# Patient Record
Sex: FEMALE | Race: WHITE | Employment: STUDENT | URBAN - METROPOLITAN AREA
[De-identification: names, ages, dates, MRNs, and addresses within clinical notes are randomized per-mention and may not be internally consistent; named-entity substitution may affect disease eponyms.]

---
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
## 2025-01-02 PROBLEM — M25.532 LEFT WRIST PAIN: Status: ACTIVE | Noted: 2025-01-02

## 2025-01-02 ASSESSMENT — ENCOUNTER SYMPTOMS
ALLEVIATING FACTORS: AVOIDING MOVEMENT IN INVOLVED AREA
QUALITY: DISCOMFORT
PAIN FREQUENCY: INTERMITTENT
QUALITY: TIGHT
ALLEVIATING FACTORS: ICE
QUALITY: STIFF

## 2025-01-05 ENCOUNTER — HOSPITAL ENCOUNTER (EMERGENCY)
Facility: HOSPITAL | Age: 20
Discharge: HOME | End: 2025-01-05
Attending: EMERGENCY MEDICINE
Payer: COMMERCIAL

## 2025-01-05 ENCOUNTER — APPOINTMENT (OUTPATIENT)
Dept: GENERAL RADIOLOGY | Facility: HOSPITAL | Age: 20
End: 2025-01-05
Attending: EMERGENCY MEDICINE
Payer: COMMERCIAL

## 2025-01-05 VITALS
WEIGHT: 150 LBS | SYSTOLIC BLOOD PRESSURE: 113 MMHG | RESPIRATION RATE: 17 BRPM | BODY MASS INDEX: 25 KG/M2 | OXYGEN SATURATION: 100 % | HEART RATE: 83 BPM | DIASTOLIC BLOOD PRESSURE: 82 MMHG | TEMPERATURE: 98 F

## 2025-01-05 DIAGNOSIS — R06.4 HYPERVENTILATION: ICD-10-CM

## 2025-01-05 DIAGNOSIS — N94.6 SEVERE MENSTRUAL CRAMPS: Primary | ICD-10-CM

## 2025-01-05 DIAGNOSIS — R55 VASOVAGAL NEAR SYNCOPE: ICD-10-CM

## 2025-01-05 LAB
ALBUMIN SERPL-MCNC: 5 G/DL (ref 3.2–4.8)
ALBUMIN/GLOB SERPL: 2 {RATIO} (ref 1–2)
ALP LIVER SERPL-CCNC: 46 U/L
ALT SERPL-CCNC: <7 U/L
ANION GAP SERPL CALC-SCNC: 7 MMOL/L (ref 0–18)
AST SERPL-CCNC: 13 U/L (ref ?–34)
ATRIAL RATE: 75 BPM
BASOPHILS # BLD AUTO: 0.03 X10(3) UL (ref 0–0.2)
BASOPHILS NFR BLD AUTO: 0.2 %
BILIRUB SERPL-MCNC: 0.7 MG/DL (ref 0.3–1.2)
BUN BLD-MCNC: 9 MG/DL (ref 9–23)
CALCIUM BLD-MCNC: 9.9 MG/DL (ref 8.7–10.4)
CHLORIDE SERPL-SCNC: 106 MMOL/L (ref 98–112)
CO2 SERPL-SCNC: 25 MMOL/L (ref 21–32)
CREAT BLD-MCNC: 0.62 MG/DL
EGFRCR SERPLBLD CKD-EPI 2021: 131 ML/MIN/1.73M2 (ref 60–?)
EOSINOPHIL # BLD AUTO: 0.02 X10(3) UL (ref 0–0.7)
EOSINOPHIL NFR BLD AUTO: 0.1 %
ERYTHROCYTE [DISTWIDTH] IN BLOOD BY AUTOMATED COUNT: 12.7 %
GLOBULIN PLAS-MCNC: 2.5 G/DL (ref 2–3.5)
GLUCOSE BLD-MCNC: 93 MG/DL (ref 70–99)
HCG SERPL QL: NEGATIVE
HCT VFR BLD AUTO: 38.3 %
HGB BLD-MCNC: 13.1 G/DL
IMM GRANULOCYTES # BLD AUTO: 0.07 X10(3) UL (ref 0–1)
IMM GRANULOCYTES NFR BLD: 0.5 %
LYMPHOCYTES # BLD AUTO: 1.26 X10(3) UL (ref 1.5–5)
LYMPHOCYTES NFR BLD AUTO: 8.7 %
MCH RBC QN AUTO: 30.8 PG (ref 26–34)
MCHC RBC AUTO-ENTMCNC: 34.2 G/DL (ref 31–37)
MCV RBC AUTO: 89.9 FL
MONOCYTES # BLD AUTO: 0.58 X10(3) UL (ref 0.1–1)
MONOCYTES NFR BLD AUTO: 4 %
NEUTROPHILS # BLD AUTO: 12.59 X10 (3) UL (ref 1.5–7.7)
NEUTROPHILS # BLD AUTO: 12.59 X10(3) UL (ref 1.5–7.7)
NEUTROPHILS NFR BLD AUTO: 86.5 %
OSMOLALITY SERPL CALC.SUM OF ELEC: 284 MOSM/KG (ref 275–295)
P AXIS: 45 DEGREES
P-R INTERVAL: 136 MS
PLATELET # BLD AUTO: 312 10(3)UL (ref 150–450)
POTASSIUM SERPL-SCNC: 3.9 MMOL/L (ref 3.5–5.1)
PROT SERPL-MCNC: 7.5 G/DL (ref 5.7–8.2)
Q-T INTERVAL: 390 MS
QRS DURATION: 74 MS
QTC CALCULATION (BEZET): 435 MS
R AXIS: 80 DEGREES
RBC # BLD AUTO: 4.26 X10(6)UL
SODIUM SERPL-SCNC: 138 MMOL/L (ref 136–145)
T AXIS: 60 DEGREES
VENTRICULAR RATE: 75 BPM
WBC # BLD AUTO: 14.6 X10(3) UL (ref 4–11)

## 2025-01-05 PROCEDURE — 93005 ELECTROCARDIOGRAM TRACING: CPT

## 2025-01-05 PROCEDURE — 99284 EMERGENCY DEPT VISIT MOD MDM: CPT

## 2025-01-05 PROCEDURE — 71046 X-RAY EXAM CHEST 2 VIEWS: CPT | Performed by: EMERGENCY MEDICINE

## 2025-01-05 PROCEDURE — 85025 COMPLETE CBC W/AUTO DIFF WBC: CPT | Performed by: EMERGENCY MEDICINE

## 2025-01-05 PROCEDURE — 96361 HYDRATE IV INFUSION ADD-ON: CPT

## 2025-01-05 PROCEDURE — 80053 COMPREHEN METABOLIC PANEL: CPT | Performed by: EMERGENCY MEDICINE

## 2025-01-05 PROCEDURE — 96374 THER/PROPH/DIAG INJ IV PUSH: CPT

## 2025-01-05 PROCEDURE — 84703 CHORIONIC GONADOTROPIN ASSAY: CPT | Performed by: EMERGENCY MEDICINE

## 2025-01-05 PROCEDURE — 93010 ELECTROCARDIOGRAM REPORT: CPT

## 2025-01-05 RX ORDER — KETOROLAC TROMETHAMINE 30 MG/ML
15 INJECTION, SOLUTION INTRAMUSCULAR; INTRAVENOUS ONCE
Status: COMPLETED | OUTPATIENT
Start: 2025-01-05 | End: 2025-01-05

## 2025-01-05 RX ORDER — KETOROLAC TROMETHAMINE 10 MG/1
10 TABLET, FILM COATED ORAL EVERY 8 HOURS PRN
Qty: 15 TABLET | Refills: 0 | Status: SHIPPED | OUTPATIENT
Start: 2025-01-05 | End: 2025-01-12
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
## 2025-01-05 NOTE — ED PROVIDER NOTES
Patient Seen in: [facility] Emergency Department      History     Chief Complaint   Patient presents with    Anxiety/Panic attack    Kavon-EVANGELINA     Stated Complaint: Patient to ED for panic attack while in the bathroom at [facility]    Subjective:   SUSAN Lazo is a 19-year-old with history of syncope who presents for evaluation of severe abdominal pain and panic attack.  She states that she started her menses today and she went to [facility].  While she was at [facility] she had sudden and severe menstrual cramps and she became panicked.  She states that she felt as if she was about to faint.  She states that she was breathing very fast and had numbness and tingling of her hands and feet.  She feels that she was hyperventilating.  911 was called and she was brought here for evaluation.    The patient states that her abdominal pain is improved but still persisting.  She denies having any other symptoms such as fever, cough, vomiting or diarrhea.    Objective:     History reviewed. No pertinent past medical history.           History reviewed. No pertinent surgical history.             Social History     Socioeconomic History    Marital status: Single   Tobacco Use    Smoking status: Never    Smokeless tobacco: Never   Vaping Use    Vaping status: Never Used   Substance and Sexual Activity    Alcohol use: Never    Drug use: Never   Other Topics Concern    Caffeine Concern Yes     Comment: occ soda    Exercise No     Social Drivers of Health      [facility]    Housing Stability                  Physical Exam     ED Triage Vitals [01/05/25 1212]   BP (!) 135/94   Pulse 75   Resp 18   Temp 97.8 °F (36.6 °C)   Temp src Temporal   SpO2 100 %   O2 Device None (Room air)       Current Vitals:   Vital Signs  BP: 126/90  Pulse: 82  Resp: 17  Temp: 97.8 °F (36.6 °C)  Temp src: Temporal  MAP (mmHg): (!) 103    Oxygen Therapy  SpO2: 95 %  O2 Device: None (Room  air)        Physical Exam  General: Well appearing child in no acute distress.  HEENT: Atraumatic, normocephalic.  Pupils equally round and reactive to light.  Extra ocular movements are intact and full.  Tympanic membranes are clear bilaterally.  Oropharynx is clear and moist.  No erythema or exudate.  Neck: Supple with good range of motion.  No lymphadenopathy and no evidence of meningismus.   Chest: Good aeration bilaterally with no rales, no retractions or wheezing.  Heart: Regular rate and rhythm.  S1 and S2.  No murmurs, no rubs or gallops.  Good peripheral pulses.  Abdomen: Nice and soft with good bowel sounds.  Non-tender and non-distended.  She states that the palpation does not make her abdominal pain worse.  She states that her pain feels like menstrual cramps.  No hepatosplenomegaly and no masses.  Extremities: Clear, warm and dry with no petechiae or purpura.  Neurologic: Alert and oriented X3.  Cranial nerves II through XII is intact.  Good tone and strength throughout.       ED Course     Labs Reviewed   CBC WITH DIFFERENTIAL WITH PLATELET - Abnormal; Notable for the following components:       Result Value    WBC 14.6 (*)     Neutrophil Absolute Prelim 12.59 (*)     Neutrophil Absolute 12.59 (*)     Lymphocyte Absolute 1.26 (*)     All other components within normal limits   COMP METABOLIC PANEL (14) - Abnormal; Notable for the following components:    Albumin 5.0 (*)     All other components within normal limits   HCG, BETA SUBUNIT, QUAL - Normal     EKG    Intervals and axes as noted on EKG report.  Rate: 75.  Rhythm: Normal sinus rhythm  Reading: Intervals were normal with a QTC of 435.  Normal axis with no ST elevation.  There was no evidence of ischemia or ventricular hypertrophy.  Normal EKG for age.  Agree with computer interpretation.            Radiology:  Imaging ordered independently visualized and interpreted by myself (along with review of radiologist's interpretation) and noted the  following: My interpretation of the chest x-ray shows no evidence of cardiomegaly or infiltrates.    XR CHEST PA + LAT CHEST (CPT=71046)    Result Date: 1/5/2025  CONCLUSION:  No acute radiographic findings.   LOCATION:  [facility]   Dictated by (CST): Stephen Barrow MD on 1/05/2025 at 1:55 PM     Finalized by (CST): Stephen Barrow MD on 1/05/2025 at 1:55 PM        Labs:  ^^ Personally ordered, reviewed, and interpreted all unique tests ordered.  Clinically significant labs noted: Her serum studies were within normal limits with exception of white count which was elevated at 14.6.  Her hemoglobin was normal and beta-hCG was negative.    Medications administered:  Medications   sodium chloride 0.9 % IV bolus 1,000 mL (1,000 mL Intravenous New Bag 1/5/25 1330)   ketorolac (Toradol) 30 MG/ML injection 15 mg (15 mg Intravenous Given 1/5/25 1330)       Pulse oximetry:  Pulse oximetry on room air is 95% and is normal.     Cardiac monitoring:  Initial heart rate is 82 and is normal for age    Vital signs:  Vitals:    01/05/25 1212 01/05/25 1315   BP: (!) 135/94 126/90   Pulse: 75 82   Resp: 18 17   Temp: 97.8 °F (36.6 °C)    TempSrc: Temporal    SpO2: 100% 95%   Weight: 68 kg        Chart review:  ^^ Review of prior external notes from unique sources (non-[facility] ED records): noted in history         MDM      Assessment & Plan:    Patient presents with abdominal pain, near syncope and hyperventilation.     ^^ Independent historian: parent   ^^ Significant history or co-morbidities that affected clinical decision making: History of syncope  ^^ Differential diagnoses considered: I considered various etiologies / differetial diagosis including but not limited to, vasovagal near syncope, menstrual cramping, panic attack, hyperventilation. The patient was well-appearing and did not show any evidence of serious bacterial infection.  ^^ Diagnostic tests considered but not performed: None    ED Course:    I obtained a EKG.  An IV was  placed and I obtained a CBC, comprehensive metabolic panel as well as beta-hCG.  She was given a normal saline bolus as well as IV Toradol.  I then obtained a chest x-ray.    Her EKG showed a normal sinus rhythm with no evidence of ischemia, dysrhythmia or ventricular hypertrophy.  She did not have any evidence of prolonged QTc.  Her chest x-ray was unremarkable with no evidence of infiltrate or consolidation.  She also did not have any evidence of cardiomegaly or pneumothorax.  Her serum studies was notable for elevated white count of 14.6.  This most likely is a stress response.  Her hemoglobin was normal and her beta-hCG was negative.  The patient stated that she felt better after the IV fluids as well as Toradol.      Her history and physical exam is most consistent with severe menstrual cramp, near vasovagal syncope, hyperventilation as well as a panic attack.  She is to continue with oral Toradol 1 tablet every 8 hours as needed for abdominal pain.  She is not to take any other NSAIDs while taking Toradol.  She is to return for any worsening abdominal pain, fever, vomiting or any concerning symptoms.      ^^ Prescription drug management considerations: Toradol was prescribed for home use  ^^ Consideration regarding hospitalization or escalation of care: N/A  ^^ Social determinants of health: None      I have considered other serious etiologies for this patient's complaints, however the presentation is not consistent with such entities. Patient was screened and evaluated during this visit.   As a treating physician attending to the patient, I determined, within reasonable clinical confidence and prior to discharge, that an emergency medical condition was not or was no longer present. Patient or caregiver understands the course of events that occurred in the emergency department.     There was no indication for further evaluation, treatment or admission on an emergency basis.  Comprehensive verbal and written  discharge and follow-up instructions were provided to help prevent relapse or worsening.  Parents were instructed to follow-up with the primary care provider for further evaluation and treatment, but to return immediately to the ER for worsening, concerning, new, changing or persisting symptoms.  I discussed the case with the parents - they had no questions, complaints, or concerns.  Parents felt comfortable going home.     This report has been produced using speech recognition software and may contain errors related to that system including, but not limited to, errors in grammar, punctuation, and spelling, as well as words and phrases that possibly may have been recognized inappropriately.  If there are any questions or concerns, contact the dictating provider for clarification.          Medical Decision Making      Disposition and Plan     Clinical Impression:  1. Severe menstrual cramps    2. Vasovagal near syncope    3. Hyperventilation         Disposition:  Discharge  1/5/2025  2:11 pm    Follow-up:  Elvira Weaver DO  [address]  [facility] [address]  [phone]    Follow up  If symptoms worsen          Medications Prescribed:  Current Discharge Medication List        START taking these medications    Details   Ketorolac Tromethamine 10 MG Oral Tab Take 1 tablet (10 mg total) by mouth every 8 (eight) hours as needed.  Qty: 15 tablet, Refills: 0                 Supplementary Documentation:

## 2025-01-05 NOTE — DISCHARGE INSTRUCTIONS
Toradol 1 tablet every 8 hours as needed for severe abdominal pain.    Do not use ibuprofen or Aleve while taking Toradol.    Continue with supportive care including frequent fluids.    Return for any worsening symptoms or concerns.

## 2025-01-06 ENCOUNTER — APPOINTMENT (OUTPATIENT)
Dept: PHYSICAL THERAPY | Age: 20
End: 2025-01-06

## 2025-01-06 DIAGNOSIS — M25.532 LEFT WRIST PAIN: Primary | ICD-10-CM

## 2025-01-06 PROCEDURE — 97110 THERAPEUTIC EXERCISES: CPT | Performed by: OCCUPATIONAL THERAPIST

## 2025-01-06 PROCEDURE — 97165 OT EVAL LOW COMPLEX 30 MIN: CPT | Performed by: OCCUPATIONAL THERAPIST

## 2025-01-06 ASSESSMENT — ENCOUNTER SYMPTOMS
QUALITY: ACHE
PAIN SEVERITY NOW: 0
QUALITY: SHARP
SUBJECTIVE PAIN PROGRESSION: IMPROVED
QUALITY: TIGHT
PAIN SCALE AT LOWEST: 3
ALLEVIATING FACTORS: REST
ALLEVIATING FACTORS: ICE
PAIN SCALE AT HIGHEST: 5

## 2025-01-09 ENCOUNTER — OFFICE VISIT (OUTPATIENT)
Dept: PHYSICAL THERAPY | Age: 20
End: 2025-01-09

## 2025-01-09 DIAGNOSIS — M25.532 LEFT WRIST PAIN: Primary | ICD-10-CM

## 2025-01-09 PROCEDURE — 97112 NEUROMUSCULAR REEDUCATION: CPT | Performed by: OCCUPATIONAL THERAPIST

## 2025-01-09 PROCEDURE — 97530 THERAPEUTIC ACTIVITIES: CPT | Performed by: OCCUPATIONAL THERAPIST

## 2025-01-09 PROCEDURE — 97110 THERAPEUTIC EXERCISES: CPT | Performed by: OCCUPATIONAL THERAPIST

## 2025-01-09 ASSESSMENT — ENCOUNTER SYMPTOMS
QUALITY: SORE
ALLEVIATING FACTORS: REST
PAIN SEVERITY NOW: 3
PAIN FREQUENCY: INTERMITTENT
PAIN LOCATION: LEFT DORSAL WRIST
QUALITY: ACHE

## 2025-01-14 ENCOUNTER — APPOINTMENT (OUTPATIENT)
Dept: PHYSICAL THERAPY | Age: 20
End: 2025-01-14

## 2025-01-14 DIAGNOSIS — M25.532 LEFT WRIST PAIN: Primary | ICD-10-CM

## 2025-01-14 PROCEDURE — 97110 THERAPEUTIC EXERCISES: CPT | Performed by: OCCUPATIONAL THERAPIST

## 2025-01-14 PROCEDURE — 97112 NEUROMUSCULAR REEDUCATION: CPT | Performed by: OCCUPATIONAL THERAPIST

## 2025-01-14 PROCEDURE — 97530 THERAPEUTIC ACTIVITIES: CPT | Performed by: OCCUPATIONAL THERAPIST

## 2025-01-14 ASSESSMENT — ENCOUNTER SYMPTOMS
PAIN LOCATION: LEFT DORSAL WRIST
ALLEVIATING FACTORS: REST
QUALITY: ACHE
ALLEVIATING FACTORS: ICE

## 2025-01-17 ENCOUNTER — APPOINTMENT (OUTPATIENT)
Dept: PHYSICAL THERAPY | Age: 20
End: 2025-01-17

## 2025-01-17 DIAGNOSIS — M25.532 LEFT WRIST PAIN: Primary | ICD-10-CM

## 2025-01-17 ASSESSMENT — ENCOUNTER SYMPTOMS
QUALITY: SORE
PAIN FREQUENCY: INTERMITTENT
ALLEVIATING FACTORS: REST
PAIN SEVERITY NOW: 2
PAIN LOCATION: LEFT VOLAR WRIST

## 2025-01-21 ENCOUNTER — APPOINTMENT (OUTPATIENT)
Dept: PHYSICAL THERAPY | Age: 20
End: 2025-01-21

## 2025-01-21 DIAGNOSIS — M25.532 LEFT WRIST PAIN: Primary | ICD-10-CM

## 2025-01-21 PROCEDURE — 97530 THERAPEUTIC ACTIVITIES: CPT | Performed by: OCCUPATIONAL THERAPIST

## 2025-01-21 PROCEDURE — 97140 MANUAL THERAPY 1/> REGIONS: CPT | Performed by: OCCUPATIONAL THERAPIST

## 2025-01-21 PROCEDURE — 97110 THERAPEUTIC EXERCISES: CPT | Performed by: OCCUPATIONAL THERAPIST

## 2025-01-21 PROCEDURE — 97112 NEUROMUSCULAR REEDUCATION: CPT | Performed by: OCCUPATIONAL THERAPIST

## 2025-01-21 ASSESSMENT — ENCOUNTER SYMPTOMS
PAIN FREQUENCY: CONSTANT
ALLEVIATING FACTORS: ICE
ALLEVIATING FACTORS: REST
QUALITY: ACHE
PAIN SCALE AT HIGHEST: 4
QUALITY: STIFF
QUALITY: TIGHT
PAIN SEVERITY NOW: 1
PAIN LOCATION: LEFT DORSAL WRIST

## 2025-01-28 ENCOUNTER — APPOINTMENT (OUTPATIENT)
Dept: PHYSICAL THERAPY | Age: 20
End: 2025-01-28

## 2025-01-28 DIAGNOSIS — M25.532 LEFT WRIST PAIN: Primary | ICD-10-CM

## 2025-01-28 PROCEDURE — 97140 MANUAL THERAPY 1/> REGIONS: CPT | Performed by: OCCUPATIONAL THERAPIST

## 2025-01-28 PROCEDURE — 97530 THERAPEUTIC ACTIVITIES: CPT | Performed by: OCCUPATIONAL THERAPIST

## 2025-01-28 PROCEDURE — 97110 THERAPEUTIC EXERCISES: CPT | Performed by: OCCUPATIONAL THERAPIST

## 2025-01-28 PROCEDURE — 97112 NEUROMUSCULAR REEDUCATION: CPT | Performed by: OCCUPATIONAL THERAPIST

## 2025-01-28 ASSESSMENT — ENCOUNTER SYMPTOMS
PAIN FREQUENCY: INTERMITTENT
PAIN SCALE AT HIGHEST: 3
ALLEVIATING FACTORS: ICE
PAIN LOCATION: LEFT VOLAR WRIST
QUALITY: ACHE
PAIN SEVERITY NOW: 2
ALLEVIATING FACTORS: REST

## 2025-02-06 DIAGNOSIS — F41.1 GAD (GENERALIZED ANXIETY DISORDER): ICD-10-CM

## 2025-02-07 NOTE — TELEPHONE ENCOUNTER
Last office visit: 11/11/24   Protocol: pass  Requested medication(s) are due for refill today: yes  Requested medication(s) are on the active medication list same strength, form, dose/ sig: yes  Requested medication(s) are managed by provider: yes  Patient has already received a courtsey refill: no    NOV: none    Asked to Return: 2/11/25

## 2025-02-21 ENCOUNTER — APPOINTMENT (OUTPATIENT)
Dept: SPORTS MEDICINE | Age: 20
End: 2025-02-21

## 2025-02-21 VITALS
HEIGHT: 64 IN | BODY MASS INDEX: 26.46 KG/M2 | SYSTOLIC BLOOD PRESSURE: 112 MMHG | WEIGHT: 155 LBS | DIASTOLIC BLOOD PRESSURE: 60 MMHG

## 2025-02-21 DIAGNOSIS — S69.92XD SCAPHOLUNATE LIGAMENT INJURY WITH NO INSTABILITY, LEFT, SUBSEQUENT ENCOUNTER: Primary | ICD-10-CM

## 2025-02-21 ASSESSMENT — ENCOUNTER SYMPTOMS
ALLERGIC/IMMUNOLOGIC NEGATIVE: 1
COUGH: 0
ABDOMINAL PAIN: 0
SLEEP DISTURBANCE: 0
SORE THROAT: 0
DIARRHEA: 0
SINUS PAIN: 0
SHORTNESS OF BREATH: 0
WOUND: 0
CONSTIPATION: 0
SEIZURES: 0
NERVOUS/ANXIOUS: 0
CHILLS: 0
WHEEZING: 0
EYE PAIN: 0
BACK PAIN: 0
FEVER: 0

## 2025-04-19 DIAGNOSIS — F90.2 ADHD (ATTENTION DEFICIT HYPERACTIVITY DISORDER), COMBINED TYPE: ICD-10-CM

## 2025-04-21 RX ORDER — METHYLPHENIDATE HYDROCHLORIDE 20 MG/1
20 TABLET ORAL
Qty: 30 TABLET | Refills: 0 | OUTPATIENT
Start: 2025-04-21

## 2025-04-21 NOTE — TELEPHONE ENCOUNTER
Sent patient a [employer] message that a visit is required before we can fill meds. La wanted to see back in Feb 2025

## 2025-04-23 RX ORDER — METHYLPHENIDATE HYDROCHLORIDE 36 MG/1
72 TABLET ORAL DAILY
Qty: 60 TABLET | Refills: 0 | OUTPATIENT
Start: 2025-04-23

## 2025-04-25 RX ORDER — SERTRALINE HYDROCHLORIDE 100 MG/1
100 TABLET, FILM COATED ORAL DAILY
Qty: 90 TABLET | Refills: 0 | Status: SHIPPED | OUTPATIENT
Start: 2025-04-25

## 2025-04-25 RX ORDER — CLONIDINE HYDROCHLORIDE 0.1 MG/1
0.1 TABLET ORAL NIGHTLY
Qty: 90 TABLET | Refills: 0 | Status: SHIPPED | OUTPATIENT
Start: 2025-04-25

## 2025-04-26 DIAGNOSIS — F90.2 ADHD (ATTENTION DEFICIT HYPERACTIVITY DISORDER), COMBINED TYPE: ICD-10-CM

## 2025-04-28 RX ORDER — METHYLPHENIDATE HYDROCHLORIDE 36 MG/1
72 TABLET ORAL DAILY
Qty: 60 TABLET | Refills: 0 | OUTPATIENT
Start: 2025-04-28

## 2025-04-28 RX ORDER — METHYLPHENIDATE HYDROCHLORIDE 36 MG/1
72 TABLET ORAL DAILY
Qty: 60 TABLET | Refills: 0 | Status: SHIPPED | OUTPATIENT
Start: 2025-04-28

## 2025-04-28 RX ORDER — METHYLPHENIDATE HYDROCHLORIDE 20 MG/1
20 TABLET ORAL
Qty: 30 TABLET | Refills: 0 | OUTPATIENT
Start: 2025-04-28

## 2025-04-28 RX ORDER — METHYLPHENIDATE HYDROCHLORIDE 20 MG/1
20 TABLET ORAL DAILY
Qty: 30 TABLET | Refills: 0 | Status: SHIPPED | OUTPATIENT
Start: 2025-04-28

## 2025-04-28 NOTE — TELEPHONE ENCOUNTER
Last office visit: 4/24/25   Protocol: pass  Requested medication(s) are due for refill today: yes  Requested medication(s) are on the active medication list same strength, form, dose/ sig: yes  Requested medication(s) are managed by provider: yes  Patient has already received a courtsey refill: no    NOV: none    Asked to Return: 5/22/25

## 2025-05-16 RX ORDER — MONTELUKAST SODIUM 10 MG/1
10 TABLET ORAL NIGHTLY
Qty: 90 TABLET | Refills: 0 | Status: SHIPPED | OUTPATIENT
Start: 2025-05-16

## 2025-08-05 ENCOUNTER — HOSPITAL ENCOUNTER (EMERGENCY)
Age: 20
Discharge: HOME | End: 2025-08-05
Attending: STUDENT IN AN ORGANIZED HEALTH CARE EDUCATION/TRAINING PROGRAM

## 2025-08-05 VITALS
DIASTOLIC BLOOD PRESSURE: 75 MMHG | OXYGEN SATURATION: 97 % | RESPIRATION RATE: 18 BRPM | SYSTOLIC BLOOD PRESSURE: 117 MMHG | TEMPERATURE: 99 F | BODY MASS INDEX: 25 KG/M2 | HEART RATE: 87 BPM | WEIGHT: 150 LBS

## 2025-08-05 DIAGNOSIS — N94.6 DYSMENORRHEA: Primary | ICD-10-CM

## 2025-08-05 LAB — B-HCG UR QL: NEGATIVE

## 2025-08-05 PROCEDURE — 99283 EMERGENCY DEPT VISIT LOW MDM: CPT

## 2025-08-05 PROCEDURE — 96372 THER/PROPH/DIAG INJ SC/IM: CPT

## 2025-08-05 PROCEDURE — 81025 URINE PREGNANCY TEST: CPT

## 2025-08-05 RX ORDER — HYDROCODONE BITARTRATE AND ACETAMINOPHEN 5; 325 MG/1; MG/1
1 TABLET ORAL ONCE
Refills: 0 | Status: COMPLETED | OUTPATIENT
Start: 2025-08-05 | End: 2025-08-05

## 2025-08-05 RX ORDER — KETOROLAC TROMETHAMINE 30 MG/ML
30 INJECTION, SOLUTION INTRAMUSCULAR; INTRAVENOUS ONCE
Status: COMPLETED | OUTPATIENT
Start: 2025-08-05 | End: 2025-08-05

## 2025-08-05 RX ORDER — HYDROCODONE BITARTRATE AND ACETAMINOPHEN 5; 325 MG/1; MG/1
1 TABLET ORAL EVERY 6 HOURS PRN
Qty: 10 TABLET | Refills: 0 | Status: SHIPPED | OUTPATIENT
Start: 2025-08-05 | End: 2025-08-10

## 2025-08-21 ENCOUNTER — PATIENT MESSAGE (OUTPATIENT)
Dept: FAMILY MEDICINE CLINIC | Facility: CLINIC | Age: 20
End: 2025-08-21

## 2025-08-22 DIAGNOSIS — J30.1 SEASONAL ALLERGIC RHINITIS DUE TO POLLEN: Primary | ICD-10-CM

## 2025-08-22 RX ORDER — MONTELUKAST SODIUM 10 MG/1
10 TABLET ORAL NIGHTLY
Qty: 30 TABLET | Refills: 0 | Status: SHIPPED | OUTPATIENT
Start: 2025-08-22

## (undated) DIAGNOSIS — F90.2 ADHD (ATTENTION DEFICIT HYPERACTIVITY DISORDER), COMBINED TYPE: ICD-10-CM

## (undated) DIAGNOSIS — R31.9 HEMATURIA, UNSPECIFIED TYPE: Primary | ICD-10-CM

## (undated) NOTE — MR AVS SNAPSHOT
[facility] [address] [facility]  [address] [facility]  [place] [address]  [phone]               Thank you for choosing us for your health care visit with SRINIVASA Castrejon. We are glad to serve you and happy to provide you with this summary of your visit.   Nickie * Notice: This list has 2 medication(s) that are the same as other medications prescribed for you. Read the directions carefully, and ask your doctor or other care provider to review them with you.             Prepmatic     Sign up for [employer] access for yo o cooking healthy meals together  o creating a rainbow shopping list to find colorful fruits and vegetables  o go on a walking scavenger hunt through the neighborhood   o grow a family garden    In addition to 5, 4, 3, 2, 1 families can make small changes

## (undated) NOTE — LETTER
Date & Time: 1/5/2025, 2:21 PM  Patient: Clifton Bourne  Encounter Provider(s):    Félix Mccoy MD       To Whom It May Concern:    Clifton Bourne was seen and treated in our department on 1/5/2025. Clifton can return to work with these limitations: If becomes dizzy, is permitted to lie down .    If you have any questions or concerns, please do not hesitate to call.        _____________________________  Physician/APC Signature

## (undated) NOTE — LETTER
01/10/22        [address] [facility] [address]      Dear Shirly Rinne records indicate that you have outstanding lab work and or testing that was ordered for you and has not yet been completed:  Orders Placed This Encounter
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
